# Patient Record
Sex: MALE | URBAN - NONMETROPOLITAN AREA
[De-identification: names, ages, dates, MRNs, and addresses within clinical notes are randomized per-mention and may not be internally consistent; named-entity substitution may affect disease eponyms.]

---

## 2023-11-28 ENCOUNTER — DOCTOR'S OFFICE (OUTPATIENT)
Dept: URBAN - NONMETROPOLITAN AREA CLINIC 1 | Facility: CLINIC | Age: 43
Setting detail: OPHTHALMOLOGY
End: 2023-11-28
Payer: COMMERCIAL

## 2023-11-28 ENCOUNTER — RX ONLY (RX ONLY)
Age: 43
End: 2023-11-28

## 2023-11-28 DIAGNOSIS — I63.50: ICD-10-CM

## 2023-11-28 DIAGNOSIS — H50.22: ICD-10-CM

## 2023-11-28 DIAGNOSIS — H16.431: ICD-10-CM

## 2023-11-28 DIAGNOSIS — H53.10: ICD-10-CM

## 2023-11-28 PROBLEM — H52.4 PRESBYOPIA: Status: ACTIVE | Noted: 2023-11-28

## 2023-11-28 PROCEDURE — 92083 EXTENDED VISUAL FIELD XM: CPT

## 2023-11-28 PROCEDURE — 99204 OFFICE O/P NEW MOD 45 MIN: CPT

## 2023-11-28 ASSESSMENT — CONFRONTATIONAL VISUAL FIELD TEST (CVF)
OD_FINDINGS: FULL
OS_FINDINGS: FULL

## 2023-12-01 PROBLEM — H53.2 DIPLOPIA: Status: ACTIVE | Noted: 2023-11-28

## 2023-12-01 ASSESSMENT — REFRACTION_AUTOREFRACTION
OD_CYLINDER: -5.25
OD_SPHERE: -2.25
OS_AXIS: 166
OS_SPHERE: -3.00
OS_CYLINDER: -6.50
OD_AXIS: 80

## 2023-12-01 ASSESSMENT — REFRACTION_MANIFEST
OD_VA2: 20/60
OS_VA1: 20/60
OS_AXIS: 150
OS_CYLINDER: -5.00
OD_CYLINDER: -2.75
OS_VA2: 20/60
OD_VA1: 20/60
OU_VA: 20/60
OD_ADD: +1.00
OD_SPHERE: -1.25
OS_ADD: +1.00
OS_SPHERE: -0.75
OD_AXIS: 140

## 2023-12-01 ASSESSMENT — SPHEQUIV_DERIVED
OS_SPHEQUIV: -3.25
OD_SPHEQUIV: -2.625
OD_SPHEQUIV: -4.875
OS_SPHEQUIV: -6.25

## 2023-12-01 ASSESSMENT — VISUAL ACUITY
OD_BCVA: 20/150-1
OS_BCVA: 20/150-1

## 2024-08-19 ENCOUNTER — TELEPHONE (OUTPATIENT)
Age: 44
End: 2024-08-19

## 2024-08-19 NOTE — TELEPHONE ENCOUNTER
Pt called in about a sooner appt, was advised before to call in mid August     No new appts or new schedules are avail in [facility] as of right now.

## 2024-08-20 NOTE — TELEPHONE ENCOUNTER
Received call from [facility] provider's office. Celeste transferred call to Dr. Chester Arriaga with [facility] vascular surgery. Says he saw pt today and is aware that pt has NP appt with neurology on December 13th.     Provider says pt has complicated problems. Currently experiencing sever HA's. History of artery dissection 2016. Vascular surgery got involved for large carotid artery in neck. Dr. Arriaga says it is important for them to find out what other issues pt may have, why does he have these HAs, what is going on in his brain, does pt need MRIs?    Dr. Arriaga reports pt has left carotid artery aneurysm, 3 mm right vertebral artery aneurysm.      Pt  must now receiving follow ups with these different specialties and hasn't been followed up on since 2016 when he was evaluated by Dr. Arriola.    Dr. Arriaga is asking that we place high priority on sooner appt for pt.    Urgent Add on - Please advise and assist with sooner appt.    Dr. Roberts - If you need to speak with [facility] vascular surgeon you can call him at his personal cell phone number, [phone] however, he would like a text first to tell him who is calling as provider does not answer unknown numbers..

## 2024-08-30 ENCOUNTER — TELEPHONE (OUTPATIENT)
Age: 44
End: 2024-08-30

## 2024-08-30 NOTE — TELEPHONE ENCOUNTER
Patient only wanted to be scheduled in [facility] office.  Scheduled patient with Dr Moon 10/10 @ 930 am.

## 2024-08-30 NOTE — TELEPHONE ENCOUNTER
PT CALLED REQUESTING A SOONER APPT IN [place] ONLY    PT IS SCHD FOR 12/13/024 @ 12:30 PM W/ Mell Roberts

## 2024-10-10 ENCOUNTER — OFFICE VISIT (OUTPATIENT)
Dept: NEUROLOGY | Facility: CLINIC | Age: 44
End: 2024-10-10
Payer: COMMERCIAL

## 2024-10-10 VITALS
WEIGHT: 169 LBS | SYSTOLIC BLOOD PRESSURE: 150 MMHG | HEART RATE: 81 BPM | OXYGEN SATURATION: 97 % | BODY MASS INDEX: 25.61 KG/M2 | HEIGHT: 68 IN | DIASTOLIC BLOOD PRESSURE: 110 MMHG

## 2024-10-10 DIAGNOSIS — R51.9 HEADACHE, UNSPECIFIED HEADACHE TYPE: Primary | ICD-10-CM

## 2024-10-10 DIAGNOSIS — G51.0 FACIAL NERVE PALSY, SECONDARY: ICD-10-CM

## 2024-10-10 DIAGNOSIS — R51.9 FACIAL PAIN: Primary | ICD-10-CM

## 2024-10-10 DIAGNOSIS — I63.50 RIGHT PONTINE CEREBROVASCULAR ACCIDENT (HCC): ICD-10-CM

## 2024-10-10 DIAGNOSIS — R41.9 COGNITIVE COMPLAINTS: ICD-10-CM

## 2024-10-10 DIAGNOSIS — R51.9 FACIAL PAIN: ICD-10-CM

## 2024-10-10 PROCEDURE — 99205 OFFICE O/P NEW HI 60 MIN: CPT | Performed by: STUDENT IN AN ORGANIZED HEALTH CARE EDUCATION/TRAINING PROGRAM

## 2024-10-10 RX ORDER — GABAPENTIN 100 MG/1
100 CAPSULE ORAL 3 TIMES DAILY
Qty: 90 CAPSULE | Refills: 11 | Status: SHIPPED | OUTPATIENT
Start: 2024-10-10 | End: 2024-10-10 | Stop reason: SDUPTHER

## 2024-10-10 RX ORDER — LISINOPRIL 5 MG/1
5 TABLET ORAL DAILY
COMMUNITY

## 2024-10-10 RX ORDER — GABAPENTIN 100 MG/1
100 CAPSULE ORAL 3 TIMES DAILY
Qty: 90 CAPSULE | Refills: 11 | Status: SHIPPED | OUTPATIENT
Start: 2024-10-10 | End: 2024-10-11

## 2024-10-10 RX ORDER — FERROUS SULFATE 325(65) MG
1 TABLET ORAL
COMMUNITY

## 2024-10-10 RX ORDER — ASPIRIN 325 MG
325 TABLET, DELAYED RELEASE (ENTERIC COATED) ORAL DAILY
COMMUNITY

## 2024-10-10 NOTE — PROGRESS NOTES
Neurology Ambulatory Visit  Name: Wiliam Domingo       : 1980       MRN: [id number]   Encounter Provider: Sumaya Moon MD   Encounter Date: 10/10/2024  Encounter department: [facility]    Wiliam Domingo is a 44 y.o. male with suspected connective tissue disease and dolichoectasia, SAH with left vertebral artery aneurysm and dissection, s/p pipe embolization, c/b right pontine hemorrhage (2016), with residual R vision and hearing loss, R ASIF and R facial nerve palsy. He presents as initial consultation for new headaches for the last year.     He describes a continuous stabbing pain in his left face, mostly in the V1 distribution, without any cutaneous triggers. There is no sensory deficit in the left face. There are no associated photophobia/phonophobia/nausea.     The etiology of his symptoms is unclear but sounds neuropathic so we can trial gabapentin. He recently had a CTA Head/Neck (at [facility]), which showed a L ICA aneurysm. I would like to repeat an MRI of his brain.   Assessment & Plan  Facial pain  - start gabapentin 100mg TID  - MRI brain wo contrast  Orders:    gabapentin (Neurontin) 100 mg capsule; Take 1 capsule (100 mg total) by mouth 3 (three) times a day    MRI brain IAC wo contrast; Future    Facial nerve palsy, secondary  He has had recent onset of conjunctival injection and irritation.   - Start artificial tears 3x a day  - Eye patch daily  - f/u with ophthalmology (patient has appointment already)    Orders:    Eye Patch MISC; Use daily    Right pontine cerebrovascular accident (HCC)  - continue aspirin, he is currently taking 325mg but will switch to 81mg once his currently supply is completed  - f/u with rheumatology for CTD  - consider repeating physical therapy due to imbalance       Cognitive complaints  - will do formal memory testing at next visit       RTC 2 mo      HISTORY OF PRESENT ILLNESS    Per my chart review:   Per vascular surgery note  "8/18/24: \"History of subarachnoid hemorrhage and intraventricular hemorrhage with left vertebral artery aneurysm and dissection in April 2016. He underwent emergent pipeline embolization (w/ stent placement) in April 2016. Was hospitalized at [facility] for extended period of time. Had G-tube placed. On lifelong 325 ASA. Has not followed-up with neuro IR in several years. During this hospital stay, there was suggestion on cerebral imaging that based on the abnormalities within the arteries, there was concern for connective tissue disorder. He does have a cousin with history of brain aneurysm as well. From his neurologic event, he has right facial paralysis and difficulty with vision in the right eye. He is also significantly hearing impaired in the right ear. He does not have any motor deficits of the right arm or leg. No issues with eating. He is not currently working or driving. He does have a brother that helps him with transportation, but has a full time job. He took an uber to our appointment today. Reports pain on the left side of his head down into his neck.\"    Initial discharge summary:   \"Wiliam Domingo is a 35 y.o. male who was admitted to [facility] on 4/13/2016 as a transfer from [facility] after presenting there with a sudden onset headache. He was found to have a subarachnoid hemorrhage, Hunt-Martinez Grade 2. There was evidence on CT of diffuse cerebral edema & partially obstructive hydrocephalus due to presence of blood in the 4th ventricle. He was seen by neurosurgery upon arrival without need for immediate neurosurgical intervention & was admitted to the ICU. There was no need for an external ventricular drain (EVD) given stable ventricular size & patient was following commands. CTA of e head/neck revealed a possible dissecting aneurysm & had a pipeline embolization of the dissecting pre & post-PICA left vertebral artery. He was started on aspirin & plavix given the placement of the stent. " Post-intervention he developed right facial weakness & a STAT MRI revealed a 1cm lesion on the posterior right cynthia, felt to be a small hematoma or hemorrhagic infarction as well as an 8mm lucency of hte medial right temporal lobe felt to be an old ischemic infarct. He was placed on a prn cardene gtt with a goal SBP <160. He was intermittently hypoxic into the 80s on 4/16 & was started on optiflow. He began having fevers around 4/15-16 w/ a Tmax of 103.7. Cultures were negative & his fever has been felt to be central due to his subarachnoid bleed & was started on bromocriptine. He had overt signs of aspiration on a swallow study on 4/16 & a dobhoff was placed for nutrition. On 4/18 he was noted to have trinh blood orally, which was felt to be related to epistaxis from pulling at the Dobhoff, & he was seen by ENT who noted that there were scabs in the nares w/o evidence of active bleeding. He had persistent neck pain, felt to be due to muscle spasms from his vertebral artery dissection & had trigger point injections on 4/19 w/ immediate relief of his pain. He had a PEG tube placed by IR on 4/22 w/o difficulty & tolerated tube feeds well w/ a plan for his anchoring sutures to be removed 14d s/p placement (5/6/2016) w/ IR in the office if discharged by that time. He had recurrent epistaxis on 4/22 requiring anterior packing & silver nitrate cauterization & given recurrence of fevers after this he was started on augmentin x5 days for possible sinusitis. He was seen by physiatry & recommendations were made for acute inpatient rehabilization. He fhad a voiding trial on 4/28, which he unfortunately failed. Urology was consulted & it was felt that he may have neurogenic bladder. A lu catheter was re-inserted & recommendations were made to keep this in x 1 month & to follow-up in the office. On 5/2 he became acutely ill, short of breath, & had leukocytosis. He was started on empiric ABX for a UTI & completed a 3 day  "course, as well as robitussin for cough. He has clinically improved significantly. He developed b/l conjunctivitis & was started on eye gtts. He is now medically stable & is to go to re[facility] today.\"    Per patient:  He is here primarily for headaches, which started a year ago. He feels a stabbing pain throughout his left face, strongest in the left temporal area but radiates through the whole right face. It occasionally radiates down his neck and into his deltoid, and occasionally involves the right side of his face as well. Over the past year there has been no change in the character.     Character: Stabbing  He denies burning or tingling.   Location: Left face, mostly around the temple  Duration: Continuous  Associated symptoms: None.  No photophobia, phonophobia, nausea, vomiting, ptosis, conjunctival injection, rhinorrhea, lacrimation, diaphoresis.   Things that make it better:  Ibuprofen initially, no longer works  Alcohol  Eating  Things that make it worse: Nothing  No change with eating, speaking or touching his face.       A year before his SAH, he was having headaches and dizziness. They thought it was a complex migraine at the time. He had a pulsing pain in his head, associated with dizziness to the point where he could not stand. In retrospect, his doctors elieve this may have been a TIA.     When he had his SAH in 2016, he remembers getting up to go to work, seeing a \"flash\" and then losing consciousness. His mother found him on the ground. He was at [facility] admitted from April to June. He was able to walk out of the hospital. His main deficits now are loss of hearing in the right ear, double vision, and right facial weakness. His balance is also affected. He needs a railing to go down the stairs. He has done therapy for his balance in the past. He denies feeling like the room is spinning. He is planning to start a yoga programming. No issues " swallowing currently.     He has been told by Vascular surgery he does not need to be on aspirin 325 but can be on 81mg.     Prior Work-up:   8/18/24: CTA Head/ Neck  1. No definite acute major large vessel branch occlusion or flow-limiting intracranial stenosis. Please correlate with neurologic symptoms and dedicated MRI brain can be performed for further evaluation.   2. Grossly stable Pipeline vascular stent is present along the tortuous intradural left vertebral artery causing artifact limiting evaluation for significant flow-limiting IntraStent stenosis. No evidence of definite residual/recurrent aneurysm detected.    3. Small about 2.9 mm aneurysm is present along the medial aspect of the supraclinoid segment of the left distal ICA on image 242/357 and 803/1187.   1. 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 Close clinical and follow-up CT angiogram recommended.   2. No flow-limiting internal carotid artery stenosis detected.   3. A 3.0 mm medial right proximal vertebral artery aneurysm is present about 6.0 mm from the origin.   MRI (4/15/16):  There is a 10 x 10 x 9 mm mixed signal intensity lesion in the region of the posterior right cynthia at the level of the right facial colliculus which may represent a small hematoma or possible small hemorrhagic infarction.   Again noted are subarachnoid and intraventricular hemorrhages.     Past Medical History:    History reviewed. No pertinent past medical history.  Past Surgical History:   Procedure Laterality Date    INSERTION STENT ARTERY Left 04/2016    left vertibreal artery    WISDOM TOOTH EXTRACTION  2022    all 4       Family History:  Family History   Problem Relation Age of Onset    Hypertension Mother     Heart attack Father     Hypertension Brother        Social History:  Social History     Tobacco Use    Smoking status: Never    Smokeless tobacco: Never   Vaping Use     "Vaping status: Never Used   Substance Use Topics    Alcohol use: Yes     Comment: socially          Allergies:  Allergies   Allergen Reactions    Other Allergic Rhinitis     Pet dander    Shellfish Allergy - Food Allergy Allergic Rhinitis       Medications:    Current Outpatient Medications:     aspirin (ECOTRIN) 325 mg EC tablet, Take 325 mg by mouth daily, Disp: , Rfl:     Eye Patch MISC, Use daily, Disp: 2 each, Rfl: 0    ferrous sulfate 325 (65 Fe) mg tablet, Take 1 tablet by mouth daily with breakfast, Disp: , Rfl:     gabapentin (Neurontin) 100 mg capsule, Take 1 capsule (100 mg total) by mouth 3 (three) times a day, Disp: 90 capsule, Rfl: 11    lisinopril (ZESTRIL) 5 mg tablet, Take 5 mg by mouth daily, Disp: , Rfl:     omeprazole (PriLOSEC) 20 mg delayed release capsule, Take 20 mg by mouth daily, Disp: , Rfl:     PSYLLIUM PO, Take 1 packet by mouth daily as needed, Disp: , Rfl:       OBJECTIVE  BP (!) 150/110 (BP Location: Left arm, Patient Position: Sitting, Cuff Size: Standard)   Pulse 81   Ht 5' 8\" (1.727 m)   Wt 76.7 kg (169 lb)   SpO2 97%   BMI 25.70 kg/m²      Labs  I have reviewed pertinent labs:    CMP:   Lab Results   Component Value Date    SODIUM 144 07/24/2024    K 3.7 07/24/2024     07/24/2024    CO2 31 07/24/2024    AGAP 7 07/24/2024    BUN 16 07/24/2024    CREATININE 0.97 07/24/2024    GLUC 84 07/24/2024    CALCIUM 9.5 07/24/2024    AST 16 07/24/2024    ALT 13 07/24/2024    ALKPHOS 46 07/24/2024    TP 7.4 07/24/2024    ALB 4.6 07/24/2024    TBILI 0.5 07/24/2024    EGFR 99 07/24/2024       Lab Results   Component Value Date/Time    VITAMIN B12 441 08/02/2024 08:14 AM    TSH 1.02 09/12/2024 08:56 AM    FOLATE 19.2 08/02/2024 08:14 AM       General Exam  GENERAL APPEARANCE:  No distress, alert, interactive and cooperative.  CARDIOVASCULAR: Warm and well perfused  LUNGS: normal work of breathing on room air  EXTREMITIES: no peripheral edema     Neurologic Exam  MENTAL " STATE:  Orientation was normal to time, place and person. Attention span and concentration were normal.      CRANIAL NERVES:  CN 2       20/400 OS, 20/70 OD  CN 3, 4, 6  Pupils round, 3 mm in diameter, equally reactive to light. No ptosis. Left gaze sustained nystagmus. R ASIF with R beating nystagmus in left eye. Normal vertical eye movements.   CN 5  90% facial sensation in the R face. L facial sensation is intact.   CN 7  Complete R facial palsy with no muscle activation and inability to close eye. Conjunctival injection and yellow discharge.   CN 8  Does not hear finger rub on the right.   CN 9  Palate elevates symmetrically.  CN 11  Normal strength of shoulder shrug and neck turning.  CN 12  Tongue midline, with normal bulk and strength.     MOTOR:  Paratonia throughout. Muscle strength was 5/5 in distal and proximal muscles in both upper and lower extremities. Mild left hand tremor on finger to nose.      REFLEXES:  RIGHT UE   LEFT UE  BR:2              BR:2    Biceps:1      Biceps:1       RIGHT LLE   LEFT LLE    Knee:1           Knee:1    Ankle:2         Ankle:2    No clonus.     SENSORY:  In both upper and lower extremities, sensation was intact to light touch.     COORDINATION:   In both upper extremities, finger-nose-finger was intact without dysmetria or overshoot. Difficulty with rapid alternating movements in bilateral hands. Dysmetria on bilateral heel to shin.      GAIT:  Wide base, unstable with externally rotated feet. Falls attempting tandem gait. No Romberg sign was present.    Administrative Statements   The total amount of time spent with the patient and on chart review and documentation was 70 minutes. Issues addressed during this clinic visit included overall management, medication counseling or monitoring, and counseling and coordination of care.

## 2024-10-11 ENCOUNTER — TELEPHONE (OUTPATIENT)
Age: 44
End: 2024-10-11

## 2024-10-11 RX ORDER — GABAPENTIN 100 MG/1
100 CAPSULE ORAL 3 TIMES DAILY
Qty: 90 CAPSULE | Refills: 11 | Status: SHIPPED | OUTPATIENT
Start: 2024-10-11

## 2024-10-11 NOTE — TELEPHONE ENCOUNTER
Sumaya Moon MD  P Neurology Epilepsy Team 1  Can you please let this patient know the following:    I checked and his MRI is safe to do with his pipeline stent. He has also had an MRI done after the stent was placed. I placed an order for an MRI brain for him to get completed.    There was an issue with the pharmacy rejecting the gabapentin prescription but it should be resolved now.    Thank you

## 2024-10-11 NOTE — TELEPHONE ENCOUNTER
Phone call to patient regarding Dr. Moon's message below.  Advised same. Patient voiced clear understanding and was appreciative of my call.    MRI is scheduled for 10-31-24

## 2024-10-15 ENCOUNTER — TELEPHONE (OUTPATIENT)
Dept: NEUROLOGY | Facility: CLINIC | Age: 44
End: 2024-10-15

## 2024-10-15 NOTE — TELEPHONE ENCOUNTER
----- Message from Antonia IZQUIERDO sent at 10/15/2024 10:03 AM EDT -----  Regarding: FW: Radiology request    ----- Message -----  From: Jeremie Hay RN  Sent: 10/14/2024   2:17 PM EDT  To: Neurology [facility]  Subject: FW: Radiology request                            Please assist. Thank you!  ----- Message -----  From: Sumaya Moon MD  Sent: 10/10/2024   4:20 PM EDT  To: Neurology Epilepsy Team 1  Subject: Radiology request                                Could I get the CTA head/Neck pushed over from [facility]? Thank you!

## 2024-10-31 ENCOUNTER — HOSPITAL ENCOUNTER (OUTPATIENT)
Dept: MRI IMAGING | Facility: CLINIC | Age: 44
Discharge: HOME | End: 2024-10-31
Payer: COMMERCIAL

## 2024-10-31 DIAGNOSIS — R51.9 FACIAL PAIN: ICD-10-CM

## 2024-10-31 PROCEDURE — 70551 MRI BRAIN STEM W/O DYE: CPT

## 2024-11-06 DIAGNOSIS — G50.0 TRIGEMINAL NEURALGIA: Primary | ICD-10-CM

## 2024-11-06 RX ORDER — OXCARBAZEPINE 150 MG/1
150 TABLET, FILM COATED ORAL 2 TIMES DAILY
Qty: 60 TABLET | Refills: 5 | Status: SHIPPED | OUTPATIENT
Start: 2024-11-06 | End: 2025-05-05

## 2024-11-06 NOTE — PROGRESS NOTES
Discussed with patient that his MRI findings show left trigeminal atrophy and displacement concerning for trigeminal neuralgia despite the fact that he does not fit the classic phenotype. Gabapentin has not helped although it is a very low dose. We will switch him to oxcarbazepine 150mg BID, and check blood work at his next visit.

## 2024-11-11 ENCOUNTER — TELEPHONE (OUTPATIENT)
Dept: NEUROLOGY | Facility: CLINIC | Age: 44
End: 2024-11-11

## 2024-11-11 NOTE — TELEPHONE ENCOUNTER
Patient brought paperwork for Dr Moon to complete in his attempt to acquire Social Secruity Disability.  He said the majority of the paperwork he gave to his PCP.  He stated the 2 pgs he brought in should be completed by a neurologist (according to his PCP)  Blank ones are scanned into his media.

## 2024-11-15 ENCOUNTER — TELEPHONE (OUTPATIENT)
Dept: NEUROLOGY | Facility: CLINIC | Age: 44
End: 2024-11-15

## 2024-11-15 NOTE — TELEPHONE ENCOUNTER
Tried to call patient to confirm a fax number for their social security forms. Patient did not answer. If patient calls back please obtain fax number for their social security office if patient wishes to have it sent there. If not they can come into office and  the completed documents.

## 2024-11-27 ENCOUNTER — TELEPHONE (OUTPATIENT)
Dept: NEUROLOGY | Facility: CLINIC | Age: 44
End: 2024-11-27

## 2024-12-05 ENCOUNTER — TELEPHONE (OUTPATIENT)
Dept: NEUROLOGY | Facility: CLINIC | Age: 44
End: 2024-12-05

## 2024-12-05 NOTE — TELEPHONE ENCOUNTER
Left a message letting pt know Dr. Moon will not be in the office 12/12 and his appt was moved to 12/16 the same time 730 and any problems with this appt to call us and we can reschedule

## 2024-12-16 ENCOUNTER — OFFICE VISIT (OUTPATIENT)
Dept: NEUROLOGY | Facility: CLINIC | Age: 44
End: 2024-12-16
Payer: COMMERCIAL

## 2024-12-16 VITALS
HEIGHT: 68 IN | BODY MASS INDEX: 26.1 KG/M2 | SYSTOLIC BLOOD PRESSURE: 136 MMHG | HEART RATE: 76 BPM | OXYGEN SATURATION: 98 % | WEIGHT: 172.2 LBS | DIASTOLIC BLOOD PRESSURE: 88 MMHG

## 2024-12-16 DIAGNOSIS — I63.50 RIGHT PONTINE CEREBROVASCULAR ACCIDENT (HCC): ICD-10-CM

## 2024-12-16 DIAGNOSIS — G50.0 FACIAL PAIN SYNDROME: Primary | ICD-10-CM

## 2024-12-16 DIAGNOSIS — G51.0 RIGHT-SIDED BELL'S PALSY: ICD-10-CM

## 2024-12-16 PROCEDURE — 99214 OFFICE O/P EST MOD 30 MIN: CPT | Performed by: STUDENT IN AN ORGANIZED HEALTH CARE EDUCATION/TRAINING PROGRAM

## 2024-12-16 NOTE — PROGRESS NOTES
Neurology Ambulatory Visit  Name: Wiliam Domingo       : 1980       MRN: [id number]   Encounter Provider: Sumaya Moon MD   Encounter Date: 2024  Encounter department: [facility]    Wiliam Domingo is a 44 y.o. 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 who presents for follow-up of left facial pain.    He describes a continuous stabbing pain in his left face and temple without cutaneous triggers and without sensory deficit, clinically inconsistent with trigeminal neuralgia. His MRI brain was concerning for left trigeminal atrophy and displacement, and there were no obvious abnormalities in his upper cervical spine. Low dose gabapentin had no effect on his symptoms. He has had improvement of pain with oxcarbazepine.       Assessment & Plan  Facial pain syndrome  - continue oxcarbazepine 150mg BID; change timing to 7AM/7PM to improve nighttime symptoms  - CMP, CBC  Orders:    Comprehensive metabolic panel; Future    CBC and differential; Future    Right-sided Bell's palsy  - continue eye drops and taping eye shut at night  - f/u with ophthalmology     Right pontine cerebrovascular accident (HCC)  2/2 L vertebral artery dissection  - continue aspirin 325mg     RTC 6 mo         INTERVAL HISTORY    He feels like his facial pain has improved since starting oxcarbazepine. His pain has gone down from a 4/10 to a 1-2/10. He takes his medication at 7am and 11:30pm. He notices his pain is worse before he takes the second dose.     He feels a shooting pains in different spots of the face. He feels it most in his face and then it goes into his left cheek and forehead. This pain is always there. There is no burning. There is nothing that provokes it. In the past it radiated down her  elbow.     He did see ophthalmology and they saw corneal irritation so he was started on eye drops and is now taping his eye shut at night.     He saw ankylosing spondylosis and they think this is the source of most of his problems. He is trying to get on an infusion which was denied by insurance.     Prior Work-up:   MRI Brain IAC (10/31/24): Personally reviewed, shows an enlarged left vertebral artery indenting the left cynthia. Right trigeminal nerve is seen but left side I cannot clearly see.   No evidence for acute infarction or acute intracranial hemorrhage.    Asymmetric atrophy of the cisternal segment of the left trigeminal nerve. Left trigeminal nerve is noted to be displaced laterally, however the tortuous left superior cerebellar artery does not appear to frankly compress the nerve.    Status post stent placement of a dolichoectatic left vertebral artery. Left vertebral artery ventrally indents the left aspect of the cynthia.    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 Small focus of hemorrhage also noted along the right dorsal aspect of the cynthia with volume loss at the level of the right facial nerve colliculus.  CTA Head/ Neck (8/18/24)  1. No definite acute major large vessel branch occlusion or flow-limiting intracranial stenosis. Please correlate with neurologic symptoms and dedicated MRI brain can be performed for further evaluation.   2. Grossly stable Pipeline vascular stent is present along the tortuous intradural left vertebral artery causing artifact limiting evaluation for significant flow-limiting IntraStent stenosis. No evidence of definite residual/recurrent aneurysm detected.    3. Small about 2.9 mm aneurysm is present along the medial aspect of the supraclinoid segment of the left distal ICA on image 242/357 and 803/1187.   1. 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 Close clinical and follow-up CT angiogram recommended.   2. No flow-limiting internal carotid artery stenosis detected.   3. A 3.0 mm medial right proximal vertebral artery aneurysm is present about 6.0 mm from the origin.   MRI (4/15/16):  There is a 10 x 10 x 9 mm mixed signal intensity lesion in the region of the posterior right cynthia at the level of the right facial colliculus which may represent a small hematoma or possible small hemorrhagic infarction.   Again noted are subarachnoid and intraventricular hemorrhages.     Past Medical History:    History reviewed. No pertinent past medical history.  Past Surgical History:   Procedure Laterality Date    INSERTION STENT ARTERY Left 04/2016    left vertibreal artery    WISDOM TOOTH EXTRACTION  2022    all 4       Family History:  Family History   Problem Relation Age of Onset    Hypertension Mother     Heart attack Father     Hypertension Brother        Social History:  Social History     Tobacco Use    Smoking status: Never    Smokeless tobacco: Never   Vaping Use    Vaping status: Never Used   Substance Use Topics    Alcohol use: Yes     Comment: socially        Allergies:  Allergies   Allergen Reactions    Other Allergic Rhinitis     Pet dander    Shellfish Allergy - Food Allergy Allergic Rhinitis       Medications:    Current Outpatient Medications:     aspirin (ECOTRIN) 325 mg EC tablet, Take 325 mg by mouth daily, Disp: , Rfl:     ferrous sulfate 325 (65 Fe) mg tablet, Take 1 tablet by mouth daily with breakfast, Disp: , Rfl:     lisinopril (ZESTRIL) 5 mg tablet, Take 5 mg by mouth daily, Disp: , Rfl:     omeprazole (PriLOSEC) 20 mg delayed release capsule, Take 20 mg by mouth daily, Disp: , Rfl:     OXcarbazepine (Trileptal) 150 mg tablet, Take 1 tablet (150 mg total) by mouth 2 (two) times a day, Disp: 60 tablet, Rfl: 5    PSYLLIUM PO, Take 1 packet by mouth daily as needed (Patient not taking: Reported on 12/16/2024), Disp: , Rfl:  "      OBJECTIVE  /88 (BP Location: Left arm, Patient Position: Sitting, Cuff Size: Standard)   Pulse 76   Ht 5' 8\" (1.727 m)   Wt 78.1 kg (172 lb 3.2 oz)   SpO2 98%   BMI 26.18 kg/m²      Labs  I have reviewed pertinent labs:  CMP:   Lab Results   Component Value Date    SODIUM 144 07/24/2024    K 3.7 07/24/2024     07/24/2024    CO2 31 07/24/2024    AGAP 7 07/24/2024    BUN 16 07/24/2024    CREATININE 0.97 07/24/2024    GLUC 84 07/24/2024    CALCIUM 9.5 07/24/2024    AST 16 07/24/2024    ALT 13 07/24/2024    ALKPHOS 46 07/24/2024    TP 7.4 07/24/2024    ALB 4.6 07/24/2024    TBILI 0.5 07/24/2024    EGFR 99 07/24/2024       Lab Results   Component Value Date/Time    VSMSRZKQ07 441 08/02/2024 08:14 AM    TSH 1.02 09/12/2024 08:56 AM    FOLATE 19.2 08/02/2024 08:14 AM    CRP 6.6 11/07/2024 08:47 AM          General Exam  GENERAL APPEARANCE:  No distress, alert, interactive and cooperative.  CARDIOVASCULAR: Warm and well perfused  LUNGS: normal work of breathing on room air  EXTREMITIES: no peripheral edema     Neurologic Exam  Mental Status: Alert and oriented to person, place, and time.   Language: Fluent, comprehension intact.  Cranial Nerves: Eyepatch over right eye. Right LMN facial droop. No significant dysarthria. Hearing is intact to conversation.  Intact trigeminal sensation bilaterally without exacerbation of symptoms.   Motor:  Antigravity in all extremities.   Gait: Unsteady gait.     Administrative Statements   The total amount of time spent with the patient and on chart review and documentation was 35 minutes. Issues addressed during this clinic visit included review of diagnostic images and medication counseling.   "

## 2025-02-12 ENCOUNTER — NURSE TRIAGE (OUTPATIENT)
Age: 45
End: 2025-02-12

## 2025-02-12 DIAGNOSIS — G50.0 TRIGEMINAL NEURALGIA: ICD-10-CM

## 2025-02-12 RX ORDER — OXCARBAZEPINE 300 MG/1
300 TABLET, FILM COATED ORAL 2 TIMES DAILY
Qty: 60 TABLET | Refills: 5 | Status: SHIPPED | OUTPATIENT
Start: 2025-02-12 | End: 2025-08-11

## 2025-02-12 NOTE — TELEPHONE ENCOUNTER
"Pt had stroke 8 years ago and facial pain has been on and off since after his stroke or about 1 year after his stroke     Patient taking Oxcarbazepine  Advil 600 mg taken today, and it was not helpful, which is why pt reached out to get provider's advisement     Denies any facial injury , swelling in face or eyes, Difficulty breathing, Difficulty swallowing , nasal discharge, chest pain and  denies any other symptoms besides ones mentioned below in triage note.    BP currently taken during call 118/74, pt feels even during his intense pain his BP is doing well.    Pt  informed that he can't rule out if its dental related as he does not get checked often. However, he feels this is his typical facial pain and requesting Dr Moon's advisement if he needs to increase his oxcarbazepine as at LOV he was doing well and now the symptoms have worsened since then, since the past 2 days, or what other recommendations provider can inform. He rather not go to ER, unless provider feels it necessary as the pain comes and goes and location changes    Pt aware if symptoms worsen to call our office back and or go to ER . He is aware if he develops ill symptoms or toothache to call his PCP and or dentist regarding those symptoms. He understood and agreeable.    Routed to provider to advise per above, as pt requesting a call back at , may leave a detailed msg.         Reason for Disposition   Face pain is a chronic symptom (recurrent or ongoing AND present > 4 weeks)     Just worsened the past 2 days    Answer Assessment - Initial Assessment Questions  1. ONSET: \"When did the pain start?\" (e.g., minutes, hours, days)      2 days ago it worsened and pt was hoping it would improve and go away but it has not so he called to get advisement  2. ONSET: \"Does the pain come and go, or has it been constant since it started?\" (e.g., constant, intermittent, fleeting)      Its does come and go with pain intensities. When he wakes up it " "is not there or not as bad and then during the day it worsens  3. SEVERITY: \"How bad is the pain?\" (Scale 1-10; mild, moderate or severe)      8/10  4. LOCATION: \"Where does it hurt?\"       Location varies some times its nose, behind eyes, ears, pt not sure if its teeth or and other issue as it feels like his typical facial pain .   5. RASH: \"Is there any redness, rash, or swelling of your face?\"      2 days ago right side of nose was slightly red and sore to the touch  but pt does not see that any longer  as far as denies redness and no soreness  as those symptoms improved. and pt had a slight nose bleed on left side nares  6. FEVER: \"Do you have a fever?\" If Yes, ask: \"What is it, how was it measured, and when did it start?\"       no  7. OTHER SYMPTOMS: \"Do you have any other symptoms?\" (e.g., fever, toothache, nasal discharge, nasal congestion, clicking sensation in jaw joint)      Pt has teeth discomfort but it is sporadic as it changes location with teeth, no certain teeth pain and at times his teeth dont hurt at all. Ears more sensitive if ear buds are in. Denies nasal discharge, or ill symptoms. Pt sounds like he has a slight stuffy nose, but pt denies any congestion.    Protocols used: Face Pain-Adult-[place]    "

## 2025-02-12 NOTE — TELEPHONE ENCOUNTER
Per Dr Moon Secure Chat Message: she asked if there Is anything provoking the pain, like talking or eating, or is it just a constant pain? I advised Dr Moon , no , nothing provoking pain, talking or eating . Pt informed its just a constant pain that comes and goes with the sharp intense pains. When he wakes up he feels fine, but during the days the pain is constant, but comes and goes with intensity    Per Dr Moon Secure chat message: Lets increase to 300mg twice a day. I also want him to get the CMP I ordered to check his sodium. She also informed she sent a new script of the Oxcarbazepine 300 mg tabs take 1 tab BID to his pharmacy    ____________________________    I spoke to patient and informed per above, he appreciated the guidance he will take Oxcarbazepine 300 mg 1 tab BID. He will also get his blood work done Per request.    He is aware if he has any other questions or worsening symptoms to call our office and or go to ER. He appreciated guidance denies any other questions at this time

## 2025-03-11 ENCOUNTER — TELEPHONE (OUTPATIENT)
Dept: NEUROLOGY | Facility: CLINIC | Age: 45
End: 2025-03-11

## 2025-04-09 ENCOUNTER — NURSE TRIAGE (OUTPATIENT)
Age: 45
End: 2025-04-09

## 2025-04-09 DIAGNOSIS — G50.0 TRIGEMINAL NEURALGIA: ICD-10-CM

## 2025-04-09 DIAGNOSIS — G50.0 FACIAL PAIN SYNDROME: Primary | ICD-10-CM

## 2025-04-09 NOTE — TELEPHONE ENCOUNTER
Spoke w/pt. He confirmed this is exactly like previous episode in February 2025. The pain may be a bit greater in intensity.   Patient will not have enough medication d/t dose increase. He is agreeable to additional rx for 150mg tablet.    Pt had CMP drawn 3/2/25 during ER visit.   Sodium 144 (135 - 145)    When would you like lab drawn? Now, or after patient has been on increased dose?     Script pended below for 150mg tablet (in addition to the already rx'd 300mg tablet).     [phone] - okay to leave detailed msg.     Dr. Moon - please advise re: timing of CMP lab and sign rx if agreeable. Thank you!

## 2025-04-09 NOTE — TELEPHONE ENCOUNTER
Left detailed msg (per consent in chart) advisig of Dr. Moon's note below.     Requested return call to confirm again that current symptoms are similar to previous episode (2/12/25)  and that pt will have CMP lab (there is an active order from 12/2024 in chart) completed.

## 2025-04-09 NOTE — TELEPHONE ENCOUNTER
Sumaya Moon MD to Me       4/9/25  2:36 PM   Thank you, is it a stabbing pain similar to what he was having before? If so, we can increase his oxcarbazepine to 450mg BID. He just needs to get his CMP drawn to check his sodium

## 2025-04-09 NOTE — TELEPHONE ENCOUNTER
"FOLLOW UP: Please call patient w/provider recommendations.    REASON FOR CONVERSATION: Facial Pain    SYMPTOMS: Pain. Please see 2/12/25 Triage encounter for past episode.     OTHER: Patient had root canal 2.5 weeks ago. Temporary crown placed yesterday. Dentist feels pain is not dental-related. Likely neurological.   Patient takes oxcarbazepine 1 tab BID (7a, 7p)     DISPOSITION: No disposition on file.    [phone] - okay to leave detailed msg.     Dr. Moon - please advise. Thank you.     Answer Assessment - Initial Assessment Questions  1. ONSET: \"When did the pain start?\" (e.g., minutes, hours, days)      Pain has been ongoing.    2. ONSET: \"Does the pain come and go, or has it been constant since it started?\" (e.g., constant, intermittent, fleeting)      Pain is constant but varies in intensity from a 4/10 - 8/10).    3. SEVERITY: \"How bad is the pain?\" (Scale 1-10; mild, moderate or severe)      7.5 -8/10 at it's worst; currently 6/10    4. LOCATION: \"Where does it hurt?\"       Both sides of face. No provoking factors (ie eating, chewing, talking).     5. RASH: \"Is there any redness, rash, or swelling of your face?\"      No.     6. FEVER: \"Do you have a fever?\" If Yes, ask: \"What is it, how was it measured, and when did it start?\"       No.     7. OTHER SYMPTOMS: \"Do you have any other symptoms?\" (e.g., fever, toothache, nasal discharge, nasal congestion, clicking sensation in jaw joint)      No other symptoms.    Protocols used: Face Pain-Adult-[place]    "

## 2025-04-10 RX ORDER — OXCARBAZEPINE 150 MG/1
TABLET, FILM COATED ORAL
Qty: 60 TABLET | Refills: 1 | Status: SHIPPED | OUTPATIENT
Start: 2025-04-10

## 2025-04-10 NOTE — TELEPHONE ENCOUNTER
Phone call to patient regarding dr. Moon's message below. Advised same. Patient voiced clear understanding. Nothing further needed at this time.

## 2025-04-10 NOTE — TELEPHONE ENCOUNTER
MD Fanny Pate, RN1 hour ago (1:20 PM)       Thank you! Sorry, I missed that, no need to repeat the CMP. I signed the additional 150 BID

## 2025-04-12 ENCOUNTER — PATIENT MESSAGE (OUTPATIENT)
Dept: NEUROLOGY | Facility: CLINIC | Age: 45
End: 2025-04-12

## 2025-04-12 ENCOUNTER — NURSE TRIAGE (OUTPATIENT)
Dept: OTHER | Facility: OTHER | Age: 45
End: 2025-04-12

## 2025-04-12 NOTE — TELEPHONE ENCOUNTER
"FOLLOW UP: Neurology     REASON FOR CONVERSATION: Medication Reaction    SYMPTOMS: Possible medication side effect with increased dosage     OTHER: n/a    DISPOSITION: Call PCP Now      Reason for Disposition   [1] Caller has URGENT medicine question about med that PCP or specialist prescribed AND [2] triager unable to answer question    Answer Assessment - Initial Assessment Questions  1. NAME of MEDICINE: \"What medicine(s) are you calling about?\"   OXcarbazepine (Trileptal) 150 mg tablet and OXcarbazepine (Trileptal) 300 mg tablet= 450 mg. Started on 4/10      2. QUESTION: \"What is your question?\" (e.g., double dose of medicine, side effect)      Possible side effects  3. PRESCRIBER: \"Who prescribed the medicine?\" Reason: if prescribed by specialist, call should be referred to that group.      Neurology   4. SYMPTOMS: \"Do you have any symptoms?\" If Yes, ask: \"What symptoms are you having?\"  \"How bad are the symptoms (e.g., mild, moderate, severe)      Facial rash, with left sided sore throat and runny nose onset 4/11  Mild diarrhea 4/10 prior to dose change    Protocols used: Medication Question Call-Adult-    "

## 2025-04-12 NOTE — TELEPHONE ENCOUNTER
Dx: Trigeminal neuralgia. Medication increased to OXcarbazepine (Trileptal) 450 mg tablet BID. Patient started new dosage on 4/10. Calling due to possible side effect (and was informed to call if these appeared) of facial rash (red bumpy rash) left sided sore throat, and runny nose. No additional symptoms. Patient unsure of related to medication or virus since patient also reports mild diarrhea prior to taking first dose.     On call provider contacted and informed of patient's concerns and status.    Provider advises as follow:   Do not suspect medication as cause. However, if he gets spots inside his mouth or in his eyes, he needs to go to the emergency room.    Informed of provider's recommendation. Care advice given. Informed to call back if worsening/developing symptoms. Verbalized understanding. Agreeable with disposition. No further questions.

## 2025-04-15 NOTE — PATIENT COMMUNICATION
Please 4/12/25 [facility] Triage Encounter for additional details    Patient called to f/u on his 4/12/25 call. He did not see [place] msg sent by Dr. Moon yesterday. Advised pt of Dr. Moon's note. He verbalized understanding.     He reports the stabbing pain that was previously 8/10 previously is better now 4-5/10. However, there is now a burning sensation where the rash is. This started yesterday. burning sensation (5/10)    Rash is on forehead (to the right)  Down cheek/sideburns on both sides of face  Redness is slightly better  Patient also feels bumps which are slightly burning behind both ears.     Oxcarbazepine 450mg BID    Patient will call PCP once he hears back from Neurology.    [phone] - Wiliam Domingo  Pt prefers phone call vs MyChart msg    Dr. Moon - please advise. Thank you.

## 2025-04-15 NOTE — PATIENT COMMUNICATION
Per JOSE bardales w/Dr. Moon, concern is for Shingles infection. Pt should contact PCP and or go to UC.     Spoke w/pt. Advised him of above. Patient verbalized understanding. He will contact PCP.

## 2025-05-27 ENCOUNTER — PATIENT MESSAGE (OUTPATIENT)
Dept: NEUROLOGY | Facility: CLINIC | Age: 45
End: 2025-05-27

## 2025-05-27 DIAGNOSIS — G50.0 TRIGEMINAL NEURALGIA: Primary | ICD-10-CM

## 2025-05-27 RX ORDER — GABAPENTIN 300 MG/1
300 CAPSULE ORAL 3 TIMES DAILY
Qty: 90 CAPSULE | Refills: 5 | Status: SHIPPED | OUTPATIENT
Start: 2025-05-27 | End: 2025-06-02 | Stop reason: SDUPTHER

## 2025-05-27 NOTE — PROGRESS NOTES
Patient is developing a rash which could be from oxcarbazepine. Will try gabapentin 300mg TID instead.     Wean off oxcarbazepine as follows: 300mg BID x 3 days, then 150mg BID for 3 days, then stop.

## 2025-05-27 NOTE — PATIENT COMMUNICATION
pt called and states that the rash has spread.  No on   shoulders, chest and back.  rash is still on the face.  all of the rashes look similar but shoulders, chest and back are worse. Certain areas are tender and feel like a minor rug burn.  shoulders started last thursday and then spead to chest and back over the weekend.   he saw dermatology today and they think it is contact dermatitis and allergic reaction to something and   derm is asking to reconsider if this is related to oxcarbazepine. he saw derm 3 times since rash started.    While we were talking, he sent this [employer] message with pictures.     Currently taking  Oxcarbazepine 300mg 1 tab bid  Oxcarbazepine 150mg 1 tab bid    He noted the rash on his face within 24 hours of increasing dose.  See health call encounter from 4/12 and [employer] message from 4/12 for more info.     Please advise  [phone]-ok to leave detailed message or ok to send [employer] message

## 2025-05-28 NOTE — PATIENT COMMUNICATION
Sumaya Moon MD to Me        5/27/25 11:07 AM  Hmm let's try decreasing the oxcarbazepine and see if it goes away. Take 300mg BID for 3 days, then 150mg BID for 3 days, then stop.      We will have to try gabapentin for the neuralgia in the meantime, I can send a prescription for 300mg TID

## 2025-05-28 NOTE — PATIENT COMMUNICATION
Pt called regarding call from yesterday.  States that he got gabapentin from the pharm but did not get a call back from the office.     Made him aware of dr tomas's message.

## 2025-06-02 ENCOUNTER — OFFICE VISIT (OUTPATIENT)
Dept: NEUROLOGY | Facility: CLINIC | Age: 45
End: 2025-06-02
Payer: COMMERCIAL

## 2025-06-02 VITALS
HEART RATE: 58 BPM | DIASTOLIC BLOOD PRESSURE: 96 MMHG | SYSTOLIC BLOOD PRESSURE: 140 MMHG | OXYGEN SATURATION: 98 % | HEIGHT: 68 IN | WEIGHT: 174 LBS | BODY MASS INDEX: 26.37 KG/M2

## 2025-06-02 DIAGNOSIS — I63.50 RIGHT PONTINE CEREBROVASCULAR ACCIDENT (HCC): ICD-10-CM

## 2025-06-02 DIAGNOSIS — G50.0 TRIGEMINAL NEURALGIA: ICD-10-CM

## 2025-06-02 DIAGNOSIS — R51.9 FACIAL PAIN: Primary | ICD-10-CM

## 2025-06-02 DIAGNOSIS — G51.0 RIGHT-SIDED BELL'S PALSY: ICD-10-CM

## 2025-06-02 PROCEDURE — 99417 PROLNG OP E/M EACH 15 MIN: CPT | Performed by: STUDENT IN AN ORGANIZED HEALTH CARE EDUCATION/TRAINING PROGRAM

## 2025-06-02 PROCEDURE — 99215 OFFICE O/P EST HI 40 MIN: CPT | Performed by: STUDENT IN AN ORGANIZED HEALTH CARE EDUCATION/TRAINING PROGRAM

## 2025-06-02 RX ORDER — CYCLOSPORINE 0.5 MG/ML
1 EMULSION OPHTHALMIC 2 TIMES DAILY
COMMUNITY
Start: 2024-12-24

## 2025-06-02 RX ORDER — AZITHROMYCIN 250 MG/1
250 TABLET, FILM COATED ORAL EVERY 24 HOURS
COMMUNITY
Start: 2025-03-03

## 2025-06-02 RX ORDER — GABAPENTIN 300 MG/1
600 CAPSULE ORAL 3 TIMES DAILY
Qty: 180 CAPSULE | Refills: 5 | Status: SHIPPED | OUTPATIENT
Start: 2025-06-02 | End: 2025-11-29

## 2025-06-02 RX ORDER — BETAMETHASONE DIPROPIONATE 0.5 MG/G
50 CREAM TOPICAL DAILY
COMMUNITY
Start: 2025-05-27

## 2025-06-02 NOTE — PROGRESS NOTES
Neurology Ambulatory Visit  Name: Wiliam Domingo       : 1980       MRN: [id number]   Encounter Provider: Sumaya Mono MD   Encounter Date: 2025  Encounter department: [facility]    Wiliam Domingo is a 44 y.o. 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 He presents in follow-up of atypical left facial pain.    He continues to describe a continuous stabbing pain throughout the left face without cutaneous triggers and without sensory deficit. His MRI brain was concerning for left trigeminal atrophy and displacement. He had improvement in pain with oxcarbazepine but unfortunately developed a rash. His pain is starting to recur off of oxcarb so we can increase his gabapentin. If needed we can add lamotrigine. I will have him see neurosurgery for consideration of trigeminal neuralgia decompression, although due to his difficult anatomy and atypical clinical features he may not be an ideal surgical candidate.     We discussed the risks and benefits of stopping aspirin for 10 days for the plastic surgery to close his right eye and prevent further corneal damage/ vision impairment. Ultimately this decision should be made by vascular surgery.       Assessment & Plan  Facial pain  - increase gabapentin to 300-300-600 x 3 days, 300-600-600 x 3 days, then 600mg TID thereafter  Orders:    Ambulatory referral to Neurosurgery; Future    Trigeminal neuralgia  - although pain is atypical he does have displacement of the trigeminal nerve so will send him for neurosurgical opinion  Orders:    Ambulatory referral to Neurosurgery; Future    gabapentin (Neurontin) 300 mg capsule; Take 2 capsules (600 mg total) by mouth 3 (three) times a day Take 1 capsule in the morning, 1 capsule at 1PM, and  "2 capsules at night for 3 days. Then take 1 capsule in the morning, 2 capsules at 1PM, and 2 capsules at night for 3 days. Then take 2 capsules three times a day thereafter.    Right pontine cerebrovascular accident (HCC)  2/2 L vertebral artery dissection  - continue aspirin 81mg  - follow-up with vascular surgery regarding whether it is safe to stop this for 10 days.        Right-sided Bell's palsy  - continue to follow-up with ophthalmology  - continue eye drops  - he is taping his eye shut at night.           RTC ~3mo      INTERVAL HISTORY  He unfortunately developed a rash on oxcarbazepine and is now off of it. He has had that rash for about a month, which was on his face, chest, and back. He took his last dose of oxcarbazepine yesterday.     He has started to get more pain starting yesterday but it is not so severe. His pain is a 5-6/10. Mostly it is in the left face around the temple and forehead, but it can go down to his jaw area and also the right side of your face. He has a stabbing pain and then also a \"rug-burn\" type of pain he thinks is from the rash.     He is on Humira for ankylosing spondylosis and his rheumatologist thinks that this is related to his hemorrhage.     He is planning to get plastic surgery in order to close his eye completely because he is getting corneal damage. They are hoping to have him off of aspirin for 10 days.       Initial History:  Discharge Summary: \"Wiliam Domingo is a 35 y.o. male who was admitted to [facility] on 4/13/2016 as a transfer from [facility] after presenting there with a sudden onset headache. He was found to have a subarachnoid hemorrhage, Hunt-Martinez Grade 2. There was evidence on CT of diffuse cerebral edema & partially obstructive hydrocephalus due to presence of blood in the 4th ventricle. He was seen by neurosurgery upon arrival without need for immediate neurosurgical intervention & was admitted to the ICU. There was no need for an external " "ventricular drain (EVD) given stable ventricular size & patient was following commands. CTA of the head/neck revealed a possible dissecting aneurysm & had a pipeline embolization of the dissecting pre & post-PICA left vertebral artery. He was started on aspirin & plavix given the placement of the stent. Post-intervention he developed right facial weakness & a STAT MRI revealed a 1cm lesion on the posterior right cynthia, felt to be a small hematoma or hemorrhagic infarction as well as an 8mm lucency of hte medial right temporal lobe felt to be an old ischemic infarct. He was placed on a prn cardene gtt with a goal SBP <160. He was intermittently hypoxic into the 80s on 4/16 & was started on optiflow. He began having fevers around 4/15-16 w/ a Tmax of 103.7. Cultures were negative & his fever has been felt to be central due to his subarachnoid bleed & was started on bromocriptine. He had overt signs of aspiration on a swallow study on 4/16 & a dobhoff was placed for nutrition. On 4/18 he was noted to have trinh blood orally, which was felt to be related to epistaxis from pulling at the Dobhoff, & he was seen by ENT who noted that there were scabs in the nares w/o evidence of active bleeding. He had persistent neck pain, felt to be due to muscle spasms from his vertebral artery dissection & had trigger point injections on 4/19 w/ immediate relief of his pain. He had a PEG tube placed by IR on 4/22 w/o difficulty & tolerated tube feeds well w/ a plan for his anchoring sutures to be removed 14d s/p placement (5/6/2016) w/ IR in the office if discharged by that time.\"    Per vascular surgery note 8/18/24: \"History of subarachnoid hemorrhage and intraventricular hemorrhage with left vertebral artery aneurysm and dissection in April 2016. He underwent emergent pipeline embolization (w/ stent placement) in April 2016. Was hospitalized at [facility] for extended period of time. Had G-tube placed. On lifelong 325 ASA. Has not " "followed-up with neuro IR in several years. During this hospital stay, there was suggestion on cerebral imaging that based on the abnormalities within the arteries, there was concern for connective tissue disorder. He does have a cousin with history of brain aneurysm as well. From his neurologic event, he has right facial paralysis and difficulty with vision in the right eye. He is also significantly hearing impaired in the right ear. He does not have any motor deficits of the right arm or leg. No issues with eating. He is not currently working or driving.\"    Prior Work-up:   MRI Brain IAC (10/31/24): Personally reviewed, shows an enlarged left vertebral artery indenting the left cynthia. Right trigeminal nerve is seen but left side I cannot clearly see.   No evidence for acute infarction or acute intracranial hemorrhage.               Asymmetric atrophy of the cisternal segment of the left trigeminal nerve. Left trigeminal nerve is noted to be displaced laterally, however the tortuous left superior cerebellar artery does not appear to frankly compress the nerve.               Status post stent placement of a dolichoectatic left vertebral artery. Left vertebral artery ventrally indents the left aspect of the cynthia.               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 Small focus of hemorrhage also noted along the right dorsal aspect of the cynthia with volume loss at the level of the right facial nerve colliculus.  CTA Head/ Neck (8/18/24)  1. No definite acute major large vessel branch occlusion or flow-limiting intracranial stenosis. Please correlate with neurologic symptoms and dedicated MRI brain can be performed for further evaluation.   2. Grossly stable Pipeline vascular stent is present along the tortuous intradural left vertebral artery causing artifact limiting evaluation for significant flow-limiting IntraStent stenosis. No evidence of definite " "residual/recurrent aneurysm detected.    3. Small about 2.9 mm aneurysm is present along the medial aspect of the supraclinoid segment of the left distal ICA on image 242/357 and 803/1187.   1. 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 Close clinical and follow-up CT angiogram recommended.   2. No flow-limiting internal carotid artery stenosis detected.   3. A 3.0 mm medial right proximal vertebral artery aneurysm is present about 6.0 mm from the origin.   MRI (4/15/16):  There is a 10 x 10 x 9 mm mixed signal intensity lesion in the region of the posterior right cynthia at the level of the right facial colliculus which may represent a small hematoma or possible small hemorrhagic infarction.   Again noted are subarachnoid and intraventricular hemorrhages.     Past Medical History:    Past Medical History[1]  Past Surgical History[2]    Family History:  Family History[3]    Social History:  Social History[4]       Allergies:  Allergies[5]    Medications:  Current Medications[6]      OBJECTIVE  /96 (BP Location: Left arm, Patient Position: Sitting, Cuff Size: Standard)   Pulse 58   Ht 5' 8\" (1.727 m)   Wt 78.9 kg (174 lb)   SpO2 98%   BMI 26.46 kg/m²      Labs  I have reviewed pertinent labs:  CBC: No results found for: \"WBC\", \"RBC\", \"HGB\", \"HCT\", \"MCV\", \"PLT\", \"ADJUSTEDWBC\", \"MCH\", \"MCHC\", \"RDW\", \"MPV\", \"NEUTROABS\"  CMP:   Lab Results   Component Value Date    SODIUM 146 (H) 05/10/2025    K 4.4 05/10/2025     05/10/2025    CO2 34 (H) 05/10/2025    AGAP 9 05/10/2025    BUN 21 05/10/2025    CREATININE 0.88 05/10/2025    GLUC 83 05/10/2025    CALCIUM 9.1 05/10/2025    AST 23 05/10/2025    ALT 43 05/10/2025    ALKPHOS 58 05/10/2025    TP 6.6 05/10/2025    ALB 4.2 05/10/2025    TBILI 0.5 05/10/2025    EGFR 108 05/10/2025       Lab Results   Component Value Date/Time    BSKVVNPV89 441 08/02/2024 08:14 AM    TSH 1.02 " 09/12/2024 08:56 AM    FOLATE 19.2 08/02/2024 08:14 AM    CRP 6.6 11/07/2024 08:47 AM            General Exam  GENERAL APPEARANCE:  No distress, alert, interactive and cooperative.  CARDIOVASCULAR: Warm and well perfused  LUNGS: normal work of breathing on room air  EXTREMITIES: no peripheral edema     Neurologic Exam  Mental Status: Alert and oriented to person, place, and time.   Language: Fluent, comprehension intact.  Cranial Nerves: Eyepatch over right eye. Right LMN facial droop. No significant dysarthria. Hearing is intact to conversation.  Intact trigeminal sensation bilaterally without exacerbation of symptoms.   Motor:  Antigravity in all extremities.   Gait: Unsteady gait.         Administrative Statements   The total amount of time spent with the patient and on chart review and documentation was 65 minutes. Issues addressed during this visit included counseling on diagnosis and prognosis, counseling on medical management, counseling on medication side effects, counseling on surgical options, and discussion of imaging findings.            [1] No past medical history on file.  [2]   Past Surgical History:  Procedure Laterality Date    INSERTION STENT ARTERY Left 04/2016    left vertibreal artery    WISDOM TOOTH EXTRACTION  2022    all 4   [3]   Family History  Problem Relation Name Age of Onset    Hypertension Mother      Heart attack Father      Hypertension Brother     [4]   Social History  Tobacco Use    Smoking status: Never    Smokeless tobacco: Never   Vaping Use    Vaping status: Never Used   Substance Use Topics    Alcohol use: Yes     Comment: socially   [5]   Allergies  Allergen Reactions    Other Allergic Rhinitis     Pet dander    Shellfish Allergy - Food Allergy Allergic Rhinitis   [6]   Current Outpatient Medications:     aspirin (ECOTRIN) 325 mg EC tablet, Take 325 mg by mouth in the morning., Disp: , Rfl:     azithromycin (ZITHROMAX) 250 mg tablet, Take 250 mg by mouth every 24 hours, Disp:  , Rfl:     betamethasone, augmented, (DIPROLENE-AF) 0.05 % cream, Apply 50 g topically daily, Disp: , Rfl:     ferrous sulfate 325 (65 Fe) mg tablet, Take 1 tablet by mouth daily with breakfast, Disp: , Rfl:     gabapentin (Neurontin) 300 mg capsule, Take 2 capsules (600 mg total) by mouth 3 (three) times a day Take 1 capsule in the morning, 1 capsule at 1PM, and 2 capsules at night for 3 days. Then take 1 capsule in the morning, 2 capsules at 1PM, and 2 capsules at night for 3 days. Then take 2 capsules three times a day thereafter., Disp: 180 capsule, Rfl: 5    lisinopril (ZESTRIL) 5 mg tablet, Take 5 mg by mouth daily (Patient taking differently: Take 20 mg by mouth in the morning.), Disp: , Rfl:     omeprazole (PriLOSEC) 20 mg delayed release capsule, Take 20 mg by mouth in the morning., Disp: , Rfl:     PSYLLIUM PO, Take 1 packet by mouth daily as needed, Disp: , Rfl:     Restasis 0.05 % ophthalmic emulsion, Administer 1 drop to both eyes 2 (two) times a day, Disp: , Rfl:

## 2025-06-02 NOTE — PATIENT INSTRUCTIONS
It was a pleasure to see you today for facial pain.     I have ordered the following tests/referrals:   Neurosurgery referral   To schedule any tests or appointments, please call the call center at [phone].    Start taking the following medication:  Take gabapentin 1 tablet in the morning, 1 tablet at 1pm, and 2 tablets at night for 3 days. Then take 1 tablet in morning, 2 tablets at 1pm, and 2 tablets at night for 3 days. Then take 2 tablets three times a day.     If you need anything, please contact us at [phone], or contact me via [employer].     Sincerely,  Dr. Moon

## 2025-07-06 ENCOUNTER — PATIENT MESSAGE (OUTPATIENT)
Dept: NEUROLOGY | Facility: CLINIC | Age: 45
End: 2025-07-06

## 2025-07-08 ENCOUNTER — TELEPHONE (OUTPATIENT)
Age: 45
End: 2025-07-08

## 2025-07-08 DIAGNOSIS — G50.0 TRIGEMINAL NEURALGIA: ICD-10-CM

## 2025-07-08 RX ORDER — GABAPENTIN 300 MG/1
900 CAPSULE ORAL 3 TIMES DAILY
Qty: 270 CAPSULE | Refills: 5 | Status: SHIPPED | OUTPATIENT
Start: 2025-07-08 | End: 2026-01-04

## 2025-07-08 NOTE — TELEPHONE ENCOUNTER
Pt called. He increased the dose of gabapentin as advised by Dr. Moon's [employer] message on 6/11/2025. Pt called because he only has enough gabapentin for this morning. He does not have enough for this afternoon or this evening. Pt is taking 900 mg TID. He needs a new script sent to the pharmacy stating to take 900 mg TID. Epic SC message sent to Dr. Moon to make her aware of this.

## 2025-07-08 NOTE — TELEPHONE ENCOUNTER
Pt sent the following message through [employer]:  Wiliam Domingo to [facility] (supporting Sumaya Moon MD)        7/6/25  2:24 PM  Im pretty low on my gabapentin we increased the quantity but never upgraded the prescription for it how should I proceed?     Wiliam domingo

## 2025-07-08 NOTE — TELEPHONE ENCOUNTER
Received Epic SC message from Dr. Moon stating that she is not in the office and the request should be sent to Dr. Santos. Epic SC message sent to Dr. Santos.

## 2025-08-11 ENCOUNTER — TELEPHONE (OUTPATIENT)
Age: 45
End: 2025-08-11